# Patient Record
Sex: FEMALE | HISPANIC OR LATINO | URBAN - METROPOLITAN AREA
[De-identification: names, ages, dates, MRNs, and addresses within clinical notes are randomized per-mention and may not be internally consistent; named-entity substitution may affect disease eponyms.]

---

## 2018-07-30 ENCOUNTER — OFFICE VISIT (OUTPATIENT)
Dept: URBAN - METROPOLITAN AREA CLINIC 88 | Facility: CLINIC | Age: 58
End: 2018-07-30
Payer: MEDICARE

## 2018-07-30 DIAGNOSIS — H11.31 CONJUNCTIVAL HEMORRHAGE, RIGHT EYE: Primary | ICD-10-CM

## 2018-07-30 PROCEDURE — 99212 OFFICE O/P EST SF 10 MIN: CPT | Performed by: OPHTHALMOLOGY

## 2018-07-30 ASSESSMENT — INTRAOCULAR PRESSURE
OD: 14
OS: 14

## 2018-07-30 NOTE — IMPRESSION/PLAN
Impression: Conjunctival hemorrhage, right eye: H11.31. Plan: Discussed diagnosis in detail with patient. Reassured patient of current condition and the conjunctival hemorrhage will resolve in 10-14 days.

## 2019-05-03 ENCOUNTER — OFFICE VISIT (OUTPATIENT)
Dept: URBAN - METROPOLITAN AREA CLINIC 88 | Facility: CLINIC | Age: 59
End: 2019-05-03
Payer: MEDICARE

## 2019-05-03 DIAGNOSIS — H26.491 OTHER SECONDARY CATARACT, RIGHT EYE: ICD-10-CM

## 2019-05-03 DIAGNOSIS — H43.812 VITREOUS DETACHMENT OF LEFT EYE: Primary | ICD-10-CM

## 2019-05-03 PROCEDURE — 99214 OFFICE O/P EST MOD 30 MIN: CPT | Performed by: OPTOMETRIST

## 2019-05-03 ASSESSMENT — INTRAOCULAR PRESSURE
OS: 15
OD: 15

## 2019-05-03 NOTE — IMPRESSION/PLAN
Impression: Vitreous detachment of left eye: H43.812. Plan: Patient educated to condition. Artificial tears and sunglasses with UV protection were recommended. Patient knows to RTC if redness or irritation are experienced.

## 2019-05-03 NOTE — IMPRESSION/PLAN
Impression: Other secondary cataract, right eye: H26.491. Plan: Discussed diagnosis with patient, no YAG recommended at this time. Will continue to monitor.

## 2020-08-28 ENCOUNTER — OFFICE VISIT (OUTPATIENT)
Dept: URBAN - METROPOLITAN AREA CLINIC 88 | Facility: CLINIC | Age: 60
End: 2020-08-28
Payer: MEDICAID

## 2020-08-28 DIAGNOSIS — H04.123 DRY EYE SYNDROME OF BILATERAL LACRIMAL GLANDS: Chronic | ICD-10-CM

## 2020-08-28 DIAGNOSIS — H01.8 OTHER SPECIFIED INFLAMMATIONS OF EYELID: Chronic | ICD-10-CM

## 2020-08-28 DIAGNOSIS — H11.823 CONJUNCTIVOCHALASIS, BILATERAL: Chronic | ICD-10-CM

## 2020-08-28 DIAGNOSIS — H43.811 VITREOUS DEGENERATION, RIGHT EYE: Chronic | ICD-10-CM

## 2020-08-28 PROCEDURE — 99214 OFFICE O/P EST MOD 30 MIN: CPT | Performed by: OPTOMETRIST

## 2020-08-28 ASSESSMENT — INTRAOCULAR PRESSURE
OS: 14
OD: 13

## 2020-08-28 ASSESSMENT — KERATOMETRY
OS: 43.50
OD: 44.25

## 2020-08-28 NOTE — IMPRESSION/PLAN
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 Plan: Ed pt this is likely contributing to frequent tearing OU. Discussed palliative vs surgical options and risks. Will do lid scrubs and lubricant eye drops at this time.  (see dry eye/bleph plans)

## 2020-08-28 NOTE — IMPRESSION/PLAN
Impression: Other specified inflammations of eyelid: H01.8. Plan: Discussed diagnosis in detail with patient. Discussed treatment options with patient.  Lid scrubs with baby shampoo BID OU

## 2020-08-28 NOTE — IMPRESSION/PLAN
Impression: Vitreous degeneration, right eye: H43.811. Plan: The condition was explained to patient. There is no evidence of retinal pathology. All signs and risks of retinal detachment and tears were discussed in detail. Patient instructed to call office immediately if any symptoms noted. No further treatment required unless signs/symptoms of retinal detachment develop.

## 2020-11-30 ENCOUNTER — OFFICE VISIT (OUTPATIENT)
Dept: URBAN - METROPOLITAN AREA CLINIC 88 | Facility: CLINIC | Age: 60
End: 2020-11-30
Payer: MEDICAID

## 2020-11-30 DIAGNOSIS — Z96.1 PRESENCE OF PSEUDOPHAKIA: ICD-10-CM

## 2020-11-30 DIAGNOSIS — H11.133 CONJUNCTIVAL PIGMENTATIONS, BILATERAL: ICD-10-CM

## 2020-11-30 DIAGNOSIS — H11.32 CONJUNCTIVAL HEMORRHAGE, LEFT EYE: Primary | ICD-10-CM

## 2020-11-30 PROCEDURE — 99212 OFFICE O/P EST SF 10 MIN: CPT | Performed by: OPHTHALMOLOGY

## 2020-11-30 ASSESSMENT — INTRAOCULAR PRESSURE
OD: 13
OS: 12

## 2020-11-30 NOTE — IMPRESSION/PLAN
Impression: Conjunctival pigmentations, bilateral: H11.133. Condition: stable. Plan: Advised patient of condition.  Continue to observe

## 2020-11-30 NOTE — IMPRESSION/PLAN
Impression: Conjunctival hemorrhage, left eye: H11.32. Condition: unstable. Plan: Discussed diagnosis in detail with patient. Reassured patient of current condition and the conjunctival hemorrhage will resolve in 10-14 days.  Recommend OTC ART tears 1 qtt TID OU